# Patient Record
Sex: MALE | URBAN - METROPOLITAN AREA
[De-identification: names, ages, dates, MRNs, and addresses within clinical notes are randomized per-mention and may not be internally consistent; named-entity substitution may affect disease eponyms.]

---

## 2021-10-04 ENCOUNTER — HOSPITAL ENCOUNTER (EMERGENCY)
Facility: MEDICAL CENTER | Age: 61
End: 2021-10-04

## 2021-10-04 VITALS
HEIGHT: 71 IN | OXYGEN SATURATION: 96 % | TEMPERATURE: 97 F | HEART RATE: 52 BPM | DIASTOLIC BLOOD PRESSURE: 77 MMHG | RESPIRATION RATE: 20 BRPM | SYSTOLIC BLOOD PRESSURE: 127 MMHG

## 2021-10-04 PROCEDURE — 302449 STATCHG TRIAGE ONLY (STATISTIC)

## 2021-10-04 NOTE — ED NOTES
"Pt LWBS. Pt stated that they \"will not be waiting this long\" and \" they will be goiung elsewhere\".  Pt signed the AMA paperwork and rushed out before wristband could be removed.   "